# Patient Record
Sex: FEMALE | Race: WHITE | NOT HISPANIC OR LATINO | Employment: OTHER | URBAN - METROPOLITAN AREA
[De-identification: names, ages, dates, MRNs, and addresses within clinical notes are randomized per-mention and may not be internally consistent; named-entity substitution may affect disease eponyms.]

---

## 2023-12-01 NOTE — PROGRESS NOTES
Chief Complaint  Chief Complaint   Patient presents with    Establish Care    Back Pain    Anxiety    ADHD        Subjective          Blessingkin Waleska is here today to establish care. The following problems were discussed:     Family history: Mother- degenerative disc disease. HTN both sides. Father- prostate cancer. Maternal grandmother- breast cancer, throat cancer. Heart disease.     Social history:Uses E-cigarette daily. Drinks ocassionally- once a week. Uses CBD products.     Malignant HTN- Not currently on medication. She quit her job and it stopped her blood pressure.     ADHD- She is on straterra and it helped her 15% of the time. She does not want to go higher. She reports it did make her sweaty and dizzy at first, but this has improved.     History of appendectomy- 1997    Hypoglycemia- Drops after she exercises. She reports she went to the ED once for her anxiety and her BG was 52.     Anxiety- On hydroxyzine.     Back pain- Started 2009. Denies any injury to this. She explains her back went out again two weeks ago. She explains she was bending into a cooler and felt like she got electrocuted and dropped down. It takes 4-5 days before she can sit comfortably again. She reports this is her lower back. Her currently back pain is 2/10. Heating pad helps the back pain. Denies aggravating factors. She went to urgent care prior and said it was muscle spasms. Describes the pain as sharp. She just takes over the counter NSAIDS for this- tylenol helps more. She had an X-ray of her back along time ago. She has never done physical therapy.    Left knee pain- She denies any injury, but it started to hurt after exercise. She explains she went to an ortho doctor and never had imaging. She was told it was tendonitis. Left knee pain currently 1/10. Describes it as dull pain. has been swollen. She reports it hurts to bend.       She is from [place]  Previous PCP was Dr. Begum  Marital status-   Children- No  Works  "as Self employed   Exercise- regularly 2 times weekly  Diet- Eating too much junk food             Review of Systems   Constitutional:  Negative for chills and fever.   HENT:  Negative for congestion.    Respiratory:  Negative for shortness of breath.    Cardiovascular:  Negative for chest pain.   Gastrointestinal:  Negative for abdominal pain, nausea and vomiting.   Genitourinary:  Negative for difficulty urinating.   Musculoskeletal:  Positive for back pain.        Left knee pain    Skin: Negative.    Neurological:  Negative for dizziness, light-headedness and headache.   Psychiatric/Behavioral:  Negative for self-injury, suicidal ideas and depressed mood. The patient is nervous/anxious.         Objective   Vital Signs:   Vitals:    12/04/23 1018   BP: 126/86   Pulse: 73   Temp: 98.6 °F (37 °C)   SpO2: 98%      Estimated body mass index is 28.15 kg/m² as calculated from the following:    Height as of this encounter: 162.6 cm (64\").    Weight as of this encounter: 74.4 kg (164 lb).    BMI is >= 25 and <30. (Overweight) The following options were offered after discussion;: exercise counseling/recommendations and nutrition counseling/recommendations                    Physical Exam  Vitals reviewed.   Constitutional:       Appearance: Normal appearance. She is normal weight.   HENT:      Head: Normocephalic and atraumatic.      Nose: Nose normal.      Mouth/Throat:      Mouth: Mucous membranes are moist.      Pharynx: Oropharynx is clear.   Eyes:      Extraocular Movements: Extraocular movements intact.      Conjunctiva/sclera: Conjunctivae normal.   Cardiovascular:      Rate and Rhythm: Normal rate and regular rhythm.      Pulses: Normal pulses.      Heart sounds: Normal heart sounds.      Comments: S1, S2 audible  Pulmonary:      Effort: Pulmonary effort is normal.      Breath sounds: Normal breath sounds.      Comments: On room air   Abdominal:      General: Abdomen is flat.      Palpations: Abdomen is soft. "   Musculoskeletal:         General: Normal range of motion.      Cervical back: Normal range of motion.   Skin:     General: Skin is warm and dry.   Neurological:      General: No focal deficit present.      Mental Status: She is alert and oriented to person, place, and time. Mental status is at baseline.   Psychiatric:         Mood and Affect: Mood normal.         Behavior: Behavior normal.         Thought Content: Thought content normal.         Judgment: Judgment normal.                Physical Exam   Result Review :             Procedures       Assessment and Plan     Diagnoses and all orders for this visit:    1. Malignant hypertension (Primary)  Assessment & Plan:  BP: 126/86    Malignant HTN- Not currently on medication. She quit her job and it stopped her blood pressure.     Denies CP, SOA, dizziness, lightheadedness, or HA      2. Attention deficit hyperactivity disorder (ADHD), unspecified ADHD type  Assessment & Plan:  ADHD- She is on straterra and it helped her 15% of the time. She does not want to go higher. She reports it did make her sweaty and dizzy at first, but this has improved.     Continue straterra       3. Encounter to establish care  Assessment & Plan:  Family history: Mother- degenerative disc disease. HTN both sides. Father- prostate cancer. Maternal grandmother- breast cancer, throat cancer. Heart disease.     Social history:Uses E-cigarette daily. Drinks ocassionally- once a week. Uses CBD products.     She is from [place]  Previous PCP was Dr. Begum  Marital status-   Children- No  Works as Self employed   Exercise- regularly 2 times weekly  Diet- Eating too much junk food      4. Hypoglycemia  Assessment & Plan:  Hypoglycemia- Drops after she exercises. She reports she went to the ED once for her anxiety and her BG was 52.     Check Cmp and hbg A1C     Orders:  -     Comprehensive Metabolic Panel  -     Hemoglobin A1c  -     Magnesium    5. Acute left-sided low back pain  without sciatica  Assessment & Plan:  Back pain- Started 2009. Denies any injury to this. She explains her back went out again two weeks ago. She explains she was bending into a cooler and felt like she got electrocuted and dropped down. It takes 4-5 days before she can sit comfortably again. She reports this is her lower back. Her currently back pain is 2/10. Heating pad helps the back pain. Denies aggravating factors. She went to urgent care prior and said it was muscle spasms. Describes the pain as sharp. She just takes over the counter NSAIDS for this- tylenol helps more. She had an X-ray of her back along time ago. She has never done physical therapy.    Prescribed medrol dose pack and flexeril     Consider physical therapy    X-ray lumbar spine ordered     Orders:  -     XR Spine Lumbar 4+ View (In Office)    6. Acute pain of left knee  Assessment & Plan:  Left knee pain- She denies any injury, but it started to hurt after exercise. She explains she went to an ortho doctor and never had imaging. She was told it was tendonitis. Left knee pain currently 1/10. Describes it as dull pain. has been swollen. She reports it hurts to bend.     She explains she has tried Voltaren cream     Prescribed steroid and voltaren cream     X-ray left knee ordered   Consider physical therapy     Orders:  -     XR Knee 1 or 2 View Left (In Office)    Other orders  -     methylPREDNISolone (MEDROL) 4 MG dose pack; Take as directed on package instructions.  Dispense: 21 tablet; Refill: 0  -     cyclobenzaprine (FLEXERIL) 10 MG tablet; Take 1 tablet by mouth 3 (Three) Times a Day As Needed for Muscle Spasms.  Dispense: 30 tablet; Refill: 0  -     Diclofenac Sodium (Voltaren) 1 % gel gel; Apply 4 g topically to the appropriate area as directed 4 (Four) Times a Day As Needed (left knee pain).  Dispense: 100 g; Refill: 0              Follow Up   Return in about 6 months (around 6/4/2024) for Annual physical.   Patient was given  instructions and counseling regarding her condition or for health maintenance advice. Please see specific information pulled into the AVS if appropriate.

## 2023-12-04 ENCOUNTER — OFFICE VISIT (OUTPATIENT)
Dept: FAMILY MEDICINE CLINIC | Facility: CLINIC | Age: 33
End: 2023-12-04
Payer: MEDICAID

## 2023-12-04 VITALS
TEMPERATURE: 98.6 F | DIASTOLIC BLOOD PRESSURE: 86 MMHG | OXYGEN SATURATION: 98 % | HEART RATE: 73 BPM | HEIGHT: 64 IN | WEIGHT: 164 LBS | BODY MASS INDEX: 28 KG/M2 | SYSTOLIC BLOOD PRESSURE: 126 MMHG

## 2023-12-04 DIAGNOSIS — E16.2 HYPOGLYCEMIA: ICD-10-CM

## 2023-12-04 DIAGNOSIS — M54.50 ACUTE LEFT-SIDED LOW BACK PAIN WITHOUT SCIATICA: ICD-10-CM

## 2023-12-04 DIAGNOSIS — M25.562 ACUTE PAIN OF LEFT KNEE: ICD-10-CM

## 2023-12-04 DIAGNOSIS — I10 MALIGNANT HYPERTENSION: Primary | ICD-10-CM

## 2023-12-04 DIAGNOSIS — F90.9 ATTENTION DEFICIT HYPERACTIVITY DISORDER (ADHD), UNSPECIFIED ADHD TYPE: ICD-10-CM

## 2023-12-04 DIAGNOSIS — Z76.89 ENCOUNTER TO ESTABLISH CARE: ICD-10-CM

## 2023-12-04 PROBLEM — M54.9 BACK PAIN: Status: ACTIVE | Noted: 2023-12-04

## 2023-12-04 PROBLEM — F41.9 ANXIETY: Status: ACTIVE | Noted: 2023-12-04

## 2023-12-04 PROBLEM — R55 VASOVAGAL EPISODE: Status: RESOLVED | Noted: 2023-12-04 | Resolved: 2023-12-04

## 2023-12-04 PROBLEM — R45.851 SUICIDAL IDEATION: Status: RESOLVED | Noted: 2023-12-04 | Resolved: 2023-12-04

## 2023-12-04 PROBLEM — R55 VASOVAGAL EPISODE: Status: ACTIVE | Noted: 2023-12-04

## 2023-12-04 PROBLEM — R45.851 SUICIDAL IDEATION: Status: ACTIVE | Noted: 2023-12-04

## 2023-12-04 PROCEDURE — 83735 ASSAY OF MAGNESIUM: CPT | Performed by: NURSE PRACTITIONER

## 2023-12-04 PROCEDURE — 80053 COMPREHEN METABOLIC PANEL: CPT | Performed by: NURSE PRACTITIONER

## 2023-12-04 PROCEDURE — 1160F RVW MEDS BY RX/DR IN RCRD: CPT | Performed by: NURSE PRACTITIONER

## 2023-12-04 PROCEDURE — 99204 OFFICE O/P NEW MOD 45 MIN: CPT | Performed by: NURSE PRACTITIONER

## 2023-12-04 PROCEDURE — 83036 HEMOGLOBIN GLYCOSYLATED A1C: CPT | Performed by: NURSE PRACTITIONER

## 2023-12-04 PROCEDURE — 3074F SYST BP LT 130 MM HG: CPT | Performed by: NURSE PRACTITIONER

## 2023-12-04 PROCEDURE — 1159F MED LIST DOCD IN RCRD: CPT | Performed by: NURSE PRACTITIONER

## 2023-12-04 PROCEDURE — 3079F DIAST BP 80-89 MM HG: CPT | Performed by: NURSE PRACTITIONER

## 2023-12-04 RX ORDER — HYDROXYZINE HYDROCHLORIDE 25 MG/ML
25 INJECTION, SOLUTION INTRAMUSCULAR EVERY 4 HOURS PRN
COMMUNITY
Start: 2022-07-18

## 2023-12-04 RX ORDER — ATOMOXETINE 40 MG/1
40 CAPSULE ORAL DAILY
COMMUNITY
Start: 2023-11-13

## 2023-12-04 RX ORDER — ATOMOXETINE 25 MG/1
25 CAPSULE ORAL DAILY
COMMUNITY
Start: 2023-03-01

## 2023-12-04 RX ORDER — METHYLPREDNISOLONE 4 MG/1
TABLET ORAL
Qty: 21 TABLET | Refills: 0 | Status: SHIPPED | OUTPATIENT
Start: 2023-12-04

## 2023-12-04 RX ORDER — HYDROXYZINE 50 MG/1
50 TABLET, FILM COATED ORAL EVERY 4 HOURS PRN
COMMUNITY
Start: 2023-11-10

## 2023-12-04 RX ORDER — CYCLOBENZAPRINE HCL 10 MG
10 TABLET ORAL 3 TIMES DAILY PRN
Qty: 30 TABLET | Refills: 0 | Status: SHIPPED | OUTPATIENT
Start: 2023-12-04

## 2023-12-04 NOTE — ASSESSMENT & PLAN NOTE
ADHD- She is on straterra and it helped her 15% of the time. She does not want to go higher. She reports it did make her sweaty and dizzy at first, but this has improved.     Continue straterra

## 2023-12-04 NOTE — ASSESSMENT & PLAN NOTE
Back pain- Started 2009. Denies any injury to this. She explains her back went out again two weeks ago. She explains she was bending into a cooler and felt like she got electrocuted and dropped down. It takes 4-5 days before she can sit comfortably again. She reports this is her lower back. Her currently back pain is 2/10. Heating pad helps the back pain. Denies aggravating factors. She went to urgent care prior and said it was muscle spasms. Describes the pain as sharp. She just takes over the counter NSAIDS for this- tylenol helps more. She had an X-ray of her back along time ago. She has never done physical therapy.    Prescribed medrol dose pack and flexeril     Consider physical therapy    X-ray lumbar spine ordered

## 2023-12-04 NOTE — PATIENT INSTRUCTIONS
Prescribed voltaren gel, medrol dose, flexeril   Encourage rest and Ice on left knee  X-ray left knee and lumbar spine ordered  Check labs

## 2023-12-04 NOTE — ASSESSMENT & PLAN NOTE
Family history: Mother- degenerative disc disease. HTN both sides. Father- prostate cancer. Maternal grandmother- breast cancer, throat cancer. Heart disease.     Social history:Uses E-cigarette daily. Drinks ocassionally- once a week. Uses CBD products.     She is from [place]  Previous PCP was Dr. Begum  Marital status-   Children- No  Works as Self employed   Exercise- regularly 2 times weekly  Diet- Eating too much junk food

## 2023-12-04 NOTE — ASSESSMENT & PLAN NOTE
BP: 126/86    Malignant HTN- Not currently on medication. She quit her job and it stopped her blood pressure.     Denies CP, SOA, dizziness, lightheadedness, or HA

## 2023-12-04 NOTE — ASSESSMENT & PLAN NOTE
Left knee pain- She denies any injury, but it started to hurt after exercise. She explains she went to an ortho doctor and never had imaging. She was told it was tendonitis. Left knee pain currently 1/10. Describes it as dull pain. has been swollen. She reports it hurts to bend.     She explains she has tried Voltaren cream     Prescribed steroid and voltaren cream     X-ray left knee ordered   Consider physical therapy

## 2023-12-04 NOTE — ASSESSMENT & PLAN NOTE
Hypoglycemia- Drops after she exercises. She reports she went to the ED once for her anxiety and her BG was 52.     Check Cmp and hbg A1C

## 2023-12-05 ENCOUNTER — TELEPHONE (OUTPATIENT)
Dept: FAMILY MEDICINE CLINIC | Facility: CLINIC | Age: 33
End: 2023-12-05
Payer: MEDICAID

## 2023-12-05 DIAGNOSIS — M54.50 ACUTE LEFT-SIDED LOW BACK PAIN WITHOUT SCIATICA: Primary | ICD-10-CM

## 2023-12-05 PROBLEM — M51.36 DDD (DEGENERATIVE DISC DISEASE), LUMBAR: Status: ACTIVE | Noted: 2023-12-05

## 2023-12-05 LAB
ALBUMIN SERPL-MCNC: 4.6 G/DL (ref 3.5–5.2)
ALBUMIN/GLOB SERPL: 1.6 G/DL
ALP SERPL-CCNC: 58 U/L (ref 39–117)
ALT SERPL W P-5'-P-CCNC: 9 U/L (ref 1–33)
ANION GAP SERPL CALCULATED.3IONS-SCNC: 12.7 MMOL/L (ref 5–15)
AST SERPL-CCNC: 18 U/L (ref 1–32)
BILIRUB SERPL-MCNC: 0.2 MG/DL (ref 0–1.2)
BUN SERPL-MCNC: 7 MG/DL (ref 6–20)
BUN/CREAT SERPL: 8 (ref 7–25)
CALCIUM SPEC-SCNC: 9.4 MG/DL (ref 8.6–10.5)
CHLORIDE SERPL-SCNC: 102 MMOL/L (ref 98–107)
CO2 SERPL-SCNC: 22.3 MMOL/L (ref 22–29)
CREAT SERPL-MCNC: 0.88 MG/DL (ref 0.57–1)
EGFRCR SERPLBLD CKD-EPI 2021: 89.1 ML/MIN/1.73
GLOBULIN UR ELPH-MCNC: 2.8 GM/DL
GLUCOSE SERPL-MCNC: 80 MG/DL (ref 65–99)
HBA1C MFR BLD: 5.2 % (ref 4.8–5.6)
MAGNESIUM SERPL-MCNC: 2.2 MG/DL (ref 1.6–2.6)
POTASSIUM SERPL-SCNC: 4.7 MMOL/L (ref 3.5–5.2)
PROT SERPL-MCNC: 7.4 G/DL (ref 6–8.5)
SODIUM SERPL-SCNC: 137 MMOL/L (ref 136–145)

## 2023-12-05 NOTE — TELEPHONE ENCOUNTER
Ekaterina was called on 12/5/2023 at 10:07am and spoke to.  Patient educated that she has to do physical therapy first before further imaging would be needed on her back.  Physical therapy referral placed for [facility] location.    Electronically signed by SIMONE Claire, 12/05/23, 10:07 AM EST.

## 2023-12-05 NOTE — TELEPHONE ENCOUNTER
"Pt called back after a missed call.  I relayed the X-ray results that Tere had written in her result note:    \"Ekaterina,  I tried to call you, however you did not answer. Just wanted to let you know that the x-ray of your knee came back normal, however you do have mild degenerative disc disease of the lumbar spine.  I would recommend you to try physical therapy first.  Please let me know if you would like to do this and I can refer you.\"    Pt does want to do PT.  Says she would prefer the [facility] location.    Also wants to know if this is something she should get an MRI for.  Her mother had to have back surgery around this same age and she is concerned.  "

## 2024-01-24 ENCOUNTER — TELEPHONE (OUTPATIENT)
Dept: FAMILY MEDICINE CLINIC | Facility: CLINIC | Age: 34
End: 2024-01-24

## 2024-01-24 ENCOUNTER — OFFICE VISIT (OUTPATIENT)
Dept: FAMILY MEDICINE CLINIC | Facility: CLINIC | Age: 34
End: 2024-01-24
Payer: MEDICAID

## 2024-01-24 VITALS
DIASTOLIC BLOOD PRESSURE: 70 MMHG | SYSTOLIC BLOOD PRESSURE: 131 MMHG | TEMPERATURE: 98.7 F | HEIGHT: 64 IN | HEART RATE: 81 BPM | BODY MASS INDEX: 29.02 KG/M2 | WEIGHT: 170 LBS | OXYGEN SATURATION: 100 %

## 2024-01-24 DIAGNOSIS — R51.9 LEFT FACIAL PAIN: Primary | ICD-10-CM

## 2024-01-24 PROCEDURE — 99214 OFFICE O/P EST MOD 30 MIN: CPT | Performed by: NURSE PRACTITIONER

## 2024-01-24 PROCEDURE — 3075F SYST BP GE 130 - 139MM HG: CPT | Performed by: NURSE PRACTITIONER

## 2024-01-24 PROCEDURE — 3078F DIAST BP <80 MM HG: CPT | Performed by: NURSE PRACTITIONER

## 2024-01-24 RX ORDER — CARBAMAZEPINE 200 MG
100 TABLET ORAL
COMMUNITY
Start: 2024-01-22

## 2024-01-24 RX ORDER — PREDNISONE 10 MG/1
TABLET ORAL
Qty: 48 TABLET | Refills: 0 | Status: SHIPPED | OUTPATIENT
Start: 2024-01-24 | End: 2024-01-25

## 2024-01-24 RX ORDER — GABAPENTIN 100 MG/1
100 CAPSULE ORAL 3 TIMES DAILY PRN
Qty: 30 CAPSULE | Refills: 0 | Status: SHIPPED | OUTPATIENT
Start: 2024-01-24

## 2024-01-24 NOTE — TELEPHONE ENCOUNTER
Caller: Ekaterina Renner    Relationship: Self    Best call back number: [phone]     What orders are you requesting (i.e. lab or imaging): MRI    Additional notes: PATIENT STATED SHE HAD A FILLING ON 1.15.24 AT THE DENTIST. ON 1.19.24 PATIENT STATED TO LOSE HER SENSE OF SMELL. ON 1.20.24 PATIENT STARTED HAVING NERVE PAIN ON HER LEFT SIDE. PATIENT WENT TO THE [facility] ON 1.22.24. THE HOSPITAL TOLD PATIENT TO FOLLOW UP WITH HER PRIMARY CARE PROVIDER TO GET AN MRI TO SEE WHAT IS CAUSING THE NERVE PAIN. THEY TOLD PATIENT SHE HAS TRIGEMINAL NEURALGIA AND PRESCRIBED PATIENT AN EPILEPSY MEDICATION TEGRETOL. PATIENTS DENTIST TOLD HER THAT THE PAIN IS NOT RELATED TO THE DENTAL WORK THAT WAS DONE ON THE 15TH AND TO REACH OUT TO HER PRIMARY CARE AS WELL. PATIENT STATED THAT ON 1.15.24 THE DENTIST HAD A HARD TIME GETTING HER MOUTH NUMB. PATIENT IS HAVING A HARD EVEN LEAVING THE HOUSE FOR AN APPOINTMENT DUE TO THE PAIN     PLEASE ADVISE

## 2024-01-24 NOTE — ASSESSMENT & PLAN NOTE
Left jaw pain- 1/15/2024 had dental procedure. She started to have nerve pain on 1/19/2024 and dentist believes it's trigiminal neuralgia. Left jaw/facial pain is 5/10. Describes the pain as radiating pain. She reports can't feel air going into her left nostril. The pain is jumping around on her left face. She started taking the carbamazepine and it has not helped her today.     Could be secondary to nerve pain from dental procedure. Rule out trigiminal neuralgia.    Prescribed predisone dose pack and gabapentin   MRI brain ordered

## 2024-01-24 NOTE — PROGRESS NOTES
"Chief Complaint  Chief Complaint   Patient presents with    Dental Pain     Pt stated went to the dentist on 22nd   Pt needs referral to have a MRI done    neuralgia         Subjective          Ekaterina Renner is a 33 year old female who presents to the office today with complaints of  left facial/jaw pain.    Left jaw pain- 1/15/2024 had dental procedure. She started to have nerve pain on 1/19/2024 and dentist believes it's trigiminal neuralgia. Left jaw/facial pain is 5/10. Describes the pain as radiating pain. She reports can't feel air going into her left nostril. The pain is jumping around on her left face. She started taking the carbamazepine and it has not helped her today.          Review of Systems   Constitutional:  Negative for chills and fever.   HENT:  Negative for congestion.         Left jaw pain/Left facial pain    Respiratory:  Negative for shortness of breath.    Cardiovascular:  Negative for chest pain.   Gastrointestinal:  Negative for abdominal pain, nausea and vomiting.   Genitourinary:  Negative for difficulty urinating.   Musculoskeletal:  Negative for myalgias.   Skin: Negative.    Neurological:  Positive for headache. Negative for dizziness and light-headedness.        Left facial pain         Objective   Vital Signs:   Vitals:    01/24/24 1410   BP: 131/70   Pulse: 81   Temp: 98.7 °F (37.1 °C)   SpO2: 100%      Estimated body mass index is 29.17 kg/m² as calculated from the following:    Height as of this encounter: 162.6 cm (64.02\").    Weight as of this encounter: 77.1 kg (170 lb).                          Physical Exam  Vitals reviewed.   Constitutional:       Appearance: Normal appearance. She is normal weight.   HENT:      Head: Normocephalic and atraumatic.        Nose: Nose normal.      Mouth/Throat:      Mouth: Mucous membranes are moist.      Pharynx: Oropharynx is clear.   Eyes:      Extraocular Movements: Extraocular movements intact.      Conjunctiva/sclera: Conjunctivae normal. "   Cardiovascular:      Rate and Rhythm: Normal rate and regular rhythm.      Pulses: Normal pulses.      Heart sounds: Normal heart sounds.      Comments: S1, S2 audible  Pulmonary:      Effort: Pulmonary effort is normal.      Breath sounds: Normal breath sounds.      Comments: On room air   Abdominal:      General: Abdomen is flat.      Palpations: Abdomen is soft.   Musculoskeletal:         General: Normal range of motion.      Cervical back: Normal range of motion.   Skin:     General: Skin is warm and dry.   Neurological:      General: No focal deficit present.      Mental Status: She is alert and oriented to person, place, and time. Mental status is at baseline.   Psychiatric:         Mood and Affect: Mood normal.         Behavior: Behavior normal.         Thought Content: Thought content normal.         Judgment: Judgment normal.                Physical Exam   Result Review :             Procedures       Assessment and Plan     Diagnoses and all orders for this visit:    1. Left facial pain (Primary)  Assessment & Plan:  Left jaw pain- 1/15/2024 had dental procedure. She started to have nerve pain on 1/19/2024 and dentist believes it's trigiminal neuralgia. Left jaw/facial pain is 5/10. Describes the pain as radiating pain. She reports can't feel air going into her left nostril. The pain is jumping around on her left face. She started taking the carbamazepine and it has not helped her today.     Could be secondary to nerve pain from dental procedure. Rule out trigiminal neuralgia.    Prescribed predisone dose pack and gabapentin   MRI brain ordered     Orders:  -     MRI Brain With & Without Contrast; Future    Other orders  -     predniSONE (DELTASONE) 10 MG (48) dose pack; Take per package instructions  Dispense: 48 tablet; Refill: 0  -     gabapentin (NEURONTIN) 100 MG capsule; Take 1 capsule by mouth 3 (Three) Times a Day As Needed (nerve pain).  Dispense: 30 capsule; Refill: 0              Follow Up    Return for Next scheduled follow up.   Patient was given instructions and counseling regarding her condition or for health maintenance advice. Please see specific information pulled into the AVS if appropriate.     Answers submitted by the patient for this visit:  Other (Submitted on 1/24/2024)  Please describe your symptoms.: Trigeminal neuralgia  Have you had these symptoms before?: No  How long have you been having these symptoms?: 5-7 days  Please list any medications you are currently taking for this condition.: Tegretol  Please describe any probable cause for these symptoms. : Dental anesthetic injectections  Primary Reason for Visit (Submitted on 1/24/2024)  What is the primary reason for your visit?: Other

## 2024-01-25 ENCOUNTER — TELEPHONE (OUTPATIENT)
Dept: FAMILY MEDICINE CLINIC | Facility: CLINIC | Age: 34
End: 2024-01-25

## 2024-01-25 RX ORDER — METHYLPREDNISOLONE 4 MG/1
TABLET ORAL
Qty: 21 EACH | Refills: 0 | Status: SHIPPED | OUTPATIENT
Start: 2024-01-25

## 2024-01-25 NOTE — TELEPHONE ENCOUNTER
Caller: Ekaterina Renner    Relationship: Self    Best call back number: [phone]     What was the call regarding: PATIENT STATED SHE COULD NEVER GET HER MRI SCHEDULED WITH [facility]ALAN.     PATIENT WOULD LIKE TO KNOW IF THE ORDER CAN BE SENT TO [facility] AND THE [facility] IN [place].     PLEASE CALL TO [facility] WHERE ORDER HAS BEEN SENT.

## 2024-02-07 ENCOUNTER — TELEPHONE (OUTPATIENT)
Dept: FAMILY MEDICINE CLINIC | Facility: CLINIC | Age: 34
End: 2024-02-07
Payer: MEDICAID

## 2024-02-07 NOTE — TELEPHONE ENCOUNTER
Caller: Ekaterina Renner    Relationship: Self    Best call back number:  [phone] (Home)     Caller requesting test results:     What test was performed: MRI     When was the test performed: 2-6-24    Where was the test performed:  [facility]     Additional notes:

## 2024-02-07 NOTE — TELEPHONE ENCOUNTER
I tried to call patient on 2/7/2024 due to MRI results.  She did not answer.  Patient's MRI results came back normal.  Her left jaw pain is likely from her dental procedure.    Electronically signed by SIMONE Claire, 02/07/24, 3:55 PM EST.

## 2024-02-09 DIAGNOSIS — K11.6 MUCUS EXTRAV CYST SALIVA GLND: Primary | ICD-10-CM

## 2024-02-12 DIAGNOSIS — J34.1 MAXILLARY SINUS CYST: Primary | ICD-10-CM

## 2024-04-30 PROBLEM — G50.0 TRIGEMINAL NEURALGIA OF LEFT SIDE OF FACE: Status: ACTIVE | Noted: 2024-04-30

## 2024-04-30 PROBLEM — Z76.89 ENCOUNTER TO ESTABLISH CARE: Status: RESOLVED | Noted: 2023-12-04 | Resolved: 2024-04-30

## 2024-05-06 PROBLEM — Z00.00 ENCOUNTER FOR ANNUAL PHYSICAL EXAM: Status: ACTIVE | Noted: 2024-05-06

## 2024-06-03 DIAGNOSIS — Z13.79 GENETIC TESTING: Primary | ICD-10-CM
